# Patient Record
Sex: FEMALE | Race: WHITE | URBAN - METROPOLITAN AREA
[De-identification: names, ages, dates, MRNs, and addresses within clinical notes are randomized per-mention and may not be internally consistent; named-entity substitution may affect disease eponyms.]

---

## 2022-04-20 ENCOUNTER — APPOINTMENT (RX ONLY)
Dept: URBAN - METROPOLITAN AREA CLINIC 162 | Facility: CLINIC | Age: 72
Setting detail: DERMATOLOGY
End: 2022-04-20

## 2022-04-20 DIAGNOSIS — Z71.89 OTHER SPECIFIED COUNSELING: ICD-10-CM

## 2022-04-20 DIAGNOSIS — L85.3 XEROSIS CUTIS: ICD-10-CM

## 2022-04-20 DIAGNOSIS — L82.0 INFLAMED SEBORRHEIC KERATOSIS: ICD-10-CM

## 2022-04-20 DIAGNOSIS — L57.0 ACTINIC KERATOSIS: ICD-10-CM

## 2022-04-20 DIAGNOSIS — L57.8 OTHER SKIN CHANGES DUE TO CHRONIC EXPOSURE TO NONIONIZING RADIATION: ICD-10-CM

## 2022-04-20 PROCEDURE — 17000 DESTRUCT PREMALG LESION: CPT | Mod: 59

## 2022-04-20 PROCEDURE — 17003 DESTRUCT PREMALG LES 2-14: CPT | Mod: 59

## 2022-04-20 PROCEDURE — ? TREATMENT REGIMEN

## 2022-04-20 PROCEDURE — 99203 OFFICE O/P NEW LOW 30 MIN: CPT | Mod: 25

## 2022-04-20 PROCEDURE — ? LIQUID NITROGEN (CM)

## 2022-04-20 PROCEDURE — ? FULL BODY SKIN EXAM

## 2022-04-20 PROCEDURE — 17110 DESTRUCTION B9 LES UP TO 14: CPT

## 2022-04-20 PROCEDURE — ? COUNSELING

## 2022-04-20 ASSESSMENT — LOCATION SIMPLE DESCRIPTION DERM
LOCATION SIMPLE: LEFT LOWER BACK
LOCATION SIMPLE: CHEST
LOCATION SIMPLE: LEFT ANTERIOR NECK
LOCATION SIMPLE: RIGHT CHEEK
LOCATION SIMPLE: RIGHT SHOULDER
LOCATION SIMPLE: LEFT UPPER BACK
LOCATION SIMPLE: RIGHT LOWER BACK
LOCATION SIMPLE: RIGHT UPPER BACK
LOCATION SIMPLE: RIGHT CLAVICULAR SKIN
LOCATION SIMPLE: LEFT TEMPLE

## 2022-04-20 ASSESSMENT — LOCATION DETAILED DESCRIPTION DERM
LOCATION DETAILED: RIGHT LATERAL MANDIBULAR CHEEK
LOCATION DETAILED: LEFT SUPERIOR MEDIAL MIDBACK
LOCATION DETAILED: RIGHT CLAVICULAR SKIN
LOCATION DETAILED: LEFT SUPERIOR UPPER BACK
LOCATION DETAILED: RIGHT INFERIOR MEDIAL MIDBACK
LOCATION DETAILED: RIGHT INFERIOR CENTRAL MALAR CHEEK
LOCATION DETAILED: LEFT CLAVICULAR NECK
LOCATION DETAILED: RIGHT MID-UPPER BACK
LOCATION DETAILED: LEFT INFERIOR TEMPLE
LOCATION DETAILED: LEFT MEDIAL UPPER BACK
LOCATION DETAILED: RIGHT CENTRAL MANDIBULAR CHEEK
LOCATION DETAILED: RIGHT ANTERIOR SHOULDER
LOCATION DETAILED: RIGHT SUPERIOR UPPER BACK
LOCATION DETAILED: LEFT LATERAL SUPERIOR CHEST

## 2022-04-20 ASSESSMENT — LOCATION ZONE DERM
LOCATION ZONE: TRUNK
LOCATION ZONE: FACE
LOCATION ZONE: NECK
LOCATION ZONE: ARM

## 2022-04-20 NOTE — PROCEDURE: TREATMENT REGIMEN
Initiate Treatment: Apply Aquaphor after shower while the skin still wet once daily.
Detail Level: Simple

## 2022-04-20 NOTE — PROCEDURE: LIQUID NITROGEN
Detail Level: Detailed
Consent: The patient's consent was obtained including but not limited to risks of crusting, scabbing, blistering, scarring, darker or lighter pigmentary change, recurrence, incomplete removal and infection.
Render Post-Care Instructions In Note?: no
Post-Care Instructions: I reviewed with the patient in detail post-care instructions. Patient is to wear sunprotection, and avoid picking at any of the treated lesions. Pt may apply Vaseline to crusted or scabbing areas.
Medical Necessity Information: It is in your best interest to select a reason for this procedure from the list below. All of these items fulfill various CMS LCD requirements except the new and changing color options.
Medical Necessity Clause: This procedure was medically necessary because the lesions that were treated were:
Size Of Lesion In Cm (Optional): 0

## 2024-03-25 ENCOUNTER — APPOINTMENT (RX ONLY)
Dept: URBAN - METROPOLITAN AREA CLINIC 162 | Facility: CLINIC | Age: 74
Setting detail: DERMATOLOGY
End: 2024-03-25

## 2024-03-25 DIAGNOSIS — Z12.83 ENCOUNTER FOR SCREENING FOR MALIGNANT NEOPLASM OF SKIN: ICD-10-CM

## 2024-03-25 DIAGNOSIS — L72.0 EPIDERMAL CYST: ICD-10-CM

## 2024-03-25 DIAGNOSIS — L81.4 OTHER MELANIN HYPERPIGMENTATION: ICD-10-CM

## 2024-03-25 DIAGNOSIS — D18.0 HEMANGIOMA: ICD-10-CM

## 2024-03-25 DIAGNOSIS — L71.8 OTHER ROSACEA: ICD-10-CM | Status: INADEQUATELY CONTROLLED

## 2024-03-25 DIAGNOSIS — D22 MELANOCYTIC NEVI: ICD-10-CM

## 2024-03-25 DIAGNOSIS — L57.0 ACTINIC KERATOSIS: ICD-10-CM | Status: WORSENING

## 2024-03-25 DIAGNOSIS — L82.1 OTHER SEBORRHEIC KERATOSIS: ICD-10-CM

## 2024-03-25 PROBLEM — D18.01 HEMANGIOMA OF SKIN AND SUBCUTANEOUS TISSUE: Status: ACTIVE | Noted: 2024-03-25

## 2024-03-25 PROBLEM — D22.62 MELANOCYTIC NEVI OF LEFT UPPER LIMB, INCLUDING SHOULDER: Status: ACTIVE | Noted: 2024-03-25

## 2024-03-25 PROCEDURE — ? PRESCRIPTION MEDICATION MANAGEMENT

## 2024-03-25 PROCEDURE — 17003 DESTRUCT PREMALG LES 2-14: CPT

## 2024-03-25 PROCEDURE — ? COUNSELING

## 2024-03-25 PROCEDURE — ? FULL BODY SKIN EXAM

## 2024-03-25 PROCEDURE — ? COSMETIC QUOTE

## 2024-03-25 PROCEDURE — ? EDUCATIONAL RESOURCES PROVIDED

## 2024-03-25 PROCEDURE — ? SKIN MEDICINALS

## 2024-03-25 PROCEDURE — ? TOPICAL RETINOID COUNSELING

## 2024-03-25 PROCEDURE — ? LIQUID NITROGEN

## 2024-03-25 PROCEDURE — 99214 OFFICE O/P EST MOD 30 MIN: CPT | Mod: 25

## 2024-03-25 PROCEDURE — 17000 DESTRUCT PREMALG LESION: CPT

## 2024-03-25 PROCEDURE — ? TREATMENT REGIMEN

## 2024-03-25 ASSESSMENT — LOCATION DETAILED DESCRIPTION DERM
LOCATION DETAILED: RIGHT CENTRAL TEMPLE
LOCATION DETAILED: LEFT DISTAL DORSAL FOREARM
LOCATION DETAILED: RIGHT MEDIAL FOREHEAD
LOCATION DETAILED: LEFT RADIAL DORSAL HAND
LOCATION DETAILED: LEFT INFERIOR MEDIAL FOREHEAD
LOCATION DETAILED: LEFT DORSAL MIDDLE METACARPOPHALANGEAL JOINT
LOCATION DETAILED: LEFT MEDIAL MALAR CHEEK
LOCATION DETAILED: LEFT INFERIOR TEMPLE
LOCATION DETAILED: RIGHT FOREHEAD
LOCATION DETAILED: RIGHT MID TEMPLE
LOCATION DETAILED: LEFT MEDIAL FOREHEAD

## 2024-03-25 ASSESSMENT — LOCATION SIMPLE DESCRIPTION DERM
LOCATION SIMPLE: LEFT CHEEK
LOCATION SIMPLE: LEFT TEMPLE
LOCATION SIMPLE: RIGHT TEMPLE
LOCATION SIMPLE: LEFT HAND
LOCATION SIMPLE: LEFT FOREARM
LOCATION SIMPLE: LEFT FOREHEAD
LOCATION SIMPLE: RIGHT FOREHEAD

## 2024-03-25 ASSESSMENT — LOCATION ZONE DERM
LOCATION ZONE: HAND
LOCATION ZONE: ARM
LOCATION ZONE: FACE

## 2024-03-25 NOTE — PROCEDURE: SKIN MEDICINALS
Sig: Take one pill daily
Sig: Apply pea sized amount per area at night
Sig: Apply to affected areas twice daily
Sig: Apply nightly to warts nightly under occlusion
Sig: Apply a thin layer to the affected areas twice daily
Sig: Take one twice daily
Sig: Apply a thin layer to the affected nails daily
Sig: Apply a thin layer to the affected skin twice daily
Sig: Take one pill twice daily
Sig: Apply to the affected skin twice daily
Sig: Apply a thin layer to the affected areas daily
Sig: Apply twice daily for 5 days
Sig: Apply a thin layer to the areas with decreased hair density 1-2 times daily
Sig: Apply a thin layer to the itching areas twice daily as needed
Sig: Apply a thin layer to the scar daily
Sig: Apply to affected areas on face twice daily
Sig: Use as directed when washing hair
Sig: Wash affected areas daily.
Detail Level: Detailed
Intro Statement: I recommended the following products:
Product Type (1): Acne
Acne Medicines Prescription 1: Tretinoin 0.025%, Niacinamide 2%, Azelaic 8%, Hyaluronic Acid 0.25% Cream

## 2024-03-25 NOTE — PROCEDURE: PRESCRIPTION MEDICATION MANAGEMENT
Render In Strict Bullet Format?: No
Detail Level: Zone
Initiate Treatment: Tretinoin with finacea.25%qhs in the face

## 2024-03-25 NOTE — PROCEDURE: COSMETIC QUOTE
Voluma Price Per Syringe: 500
Botox Price Per Unit: 15
Detail Level: Simple
Misc Procedure Description: DPN destruction
Discount Percentage: 0
Notice: We have created a more complete Cosmetic Quote plan.  The procedure name is also Cosmetic Quote.  Please review the new plan and hide the Cosmetic Quote plan you do not want to use.

## 2024-03-25 NOTE — PROCEDURE: LIQUID NITROGEN
Duration Of Freeze Thaw-Cycle (Seconds): 10
Post-Care Instructions: I reviewed with the patient in detail post-care instructions. Patient is to wear sunprotection, and avoid picking at any of the treated lesions. Pt may apply Vaseline to crusted or scabbing areas.
Number Of Freeze-Thaw Cycles: 3 freeze-thaw cycles
Render Note In Bullet Format When Appropriate: No
Show Aperture Variable?: Yes
Detail Level: Detailed
Consent: The patient's consent was obtained including but not limited to risks of crusting, scabbing, blistering, scarring, darker or lighter pigmentary change, recurrence, incomplete removal and infection.

## 2024-07-23 ENCOUNTER — APPOINTMENT (RX ONLY)
Dept: URBAN - METROPOLITAN AREA CLINIC 162 | Facility: CLINIC | Age: 74
Setting detail: DERMATOLOGY
End: 2024-07-23

## 2024-07-23 DIAGNOSIS — L91.8 OTHER HYPERTROPHIC DISORDERS OF THE SKIN: ICD-10-CM | Status: STABLE

## 2024-07-23 PROCEDURE — ? BENIGN DESTRUCTION COSMETIC

## 2024-07-23 PROCEDURE — ? COUNSELING

## 2024-07-23 ASSESSMENT — LOCATION DETAILED DESCRIPTION DERM
LOCATION DETAILED: RIGHT PROXIMAL POSTERIOR UPPER ARM
LOCATION DETAILED: LEFT ANTERIOR PROXIMAL UPPER ARM
LOCATION DETAILED: RIGHT MEDIAL BREAST 2-3:00 REGION
LOCATION DETAILED: RIGHT MEDIAL SUPERIOR CHEST
LOCATION DETAILED: LEFT PROXIMAL POSTERIOR UPPER ARM
LOCATION DETAILED: RIGHT ANTERIOR SHOULDER
LOCATION DETAILED: RIGHT CLAVICULAR NECK
LOCATION DETAILED: RIGHT LATERAL BUCCAL CHEEK
LOCATION DETAILED: LEFT CENTRAL TEMPLE
LOCATION DETAILED: LEFT CLAVICULAR NECK
LOCATION DETAILED: RIGHT INFERIOR LATERAL NECK
LOCATION DETAILED: LEFT INFERIOR LATERAL NECK
LOCATION DETAILED: LEFT POSTERIOR SHOULDER
LOCATION DETAILED: LEFT MEDIAL SUPERIOR CHEST
LOCATION DETAILED: LEFT SUPERIOR UPPER BACK

## 2024-07-23 ASSESSMENT — LOCATION ZONE DERM
LOCATION ZONE: NECK
LOCATION ZONE: TRUNK
LOCATION ZONE: ARM
LOCATION ZONE: FACE

## 2024-07-23 ASSESSMENT — LOCATION SIMPLE DESCRIPTION DERM
LOCATION SIMPLE: RIGHT ANTERIOR NECK
LOCATION SIMPLE: LEFT ANTERIOR NECK
LOCATION SIMPLE: LEFT UPPER ARM
LOCATION SIMPLE: LEFT SHOULDER
LOCATION SIMPLE: LEFT UPPER BACK
LOCATION SIMPLE: LEFT POSTERIOR UPPER ARM
LOCATION SIMPLE: RIGHT SHOULDER
LOCATION SIMPLE: RIGHT POSTERIOR UPPER ARM
LOCATION SIMPLE: RIGHT BREAST
LOCATION SIMPLE: RIGHT CHEEK
LOCATION SIMPLE: CHEST
LOCATION SIMPLE: LEFT TEMPLE

## 2024-07-23 NOTE — PROCEDURE: BENIGN DESTRUCTION COSMETIC
Price (Use Numbers Only, No Special Characters Or $): 150
Consent: The patient's consent was obtained including but not limited to risks of crusting, scabbing, blistering, scarring, darker or lighter pigmentary change, recurrence, incomplete removal and infection.
Post-Care Instructions: I reviewed with the patient in detail post-care instructions. Patient is to wear sunprotection, and avoid picking at any of the treated lesions. Pt may apply Vaseline to crusted or scabbing areas.
Detail Level: Detailed

## 2025-06-09 ENCOUNTER — APPOINTMENT (OUTPATIENT)
Dept: URBAN - METROPOLITAN AREA CLINIC 141 | Facility: CLINIC | Age: 75
Setting detail: DERMATOLOGY
End: 2025-06-09

## 2025-06-09 DIAGNOSIS — L82.1 OTHER SEBORRHEIC KERATOSIS: ICD-10-CM

## 2025-06-09 DIAGNOSIS — L91.8 OTHER HYPERTROPHIC DISORDERS OF THE SKIN: ICD-10-CM

## 2025-06-09 DIAGNOSIS — L30.0 NUMMULAR DERMATITIS: ICD-10-CM

## 2025-06-09 DIAGNOSIS — L72.0 EPIDERMAL CYST: ICD-10-CM

## 2025-06-09 PROCEDURE — ? ADDITIONAL NOTES

## 2025-06-09 PROCEDURE — ? COUNSELING: TOPICAL STEROIDS

## 2025-06-09 PROCEDURE — ? PRESCRIPTION

## 2025-06-09 PROCEDURE — ? DEFER

## 2025-06-09 PROCEDURE — ? COUNSELING

## 2025-06-09 RX ORDER — TRIAMCINOLONE ACETONIDE 1 MG/G
CREAM TOPICAL BID
Qty: 80 | Refills: 2 | Status: ERX | COMMUNITY
Start: 2025-06-09

## 2025-06-09 RX ADMIN — TRIAMCINOLONE ACETONIDE: 1 CREAM TOPICAL at 00:00

## 2025-06-09 ASSESSMENT — LOCATION DETAILED DESCRIPTION DERM
LOCATION DETAILED: RIGHT LATERAL EYEBROW
LOCATION DETAILED: LEFT MEDIAL MALAR CHEEK
LOCATION DETAILED: LEFT PROXIMAL PRETIBIAL REGION
LOCATION DETAILED: LEFT INFERIOR MEDIAL MALAR CHEEK
LOCATION DETAILED: RIGHT PROXIMAL PRETIBIAL REGION
LOCATION DETAILED: RIGHT INFERIOR CENTRAL MALAR CHEEK
LOCATION DETAILED: RIGHT MEDIAL MALAR CHEEK
LOCATION DETAILED: LEFT INFERIOR ANTERIOR NECK

## 2025-06-09 ASSESSMENT — LOCATION SIMPLE DESCRIPTION DERM
LOCATION SIMPLE: RIGHT EYEBROW
LOCATION SIMPLE: RIGHT PRETIBIAL REGION
LOCATION SIMPLE: LEFT CHEEK
LOCATION SIMPLE: RIGHT CHEEK
LOCATION SIMPLE: LEFT PRETIBIAL REGION
LOCATION SIMPLE: LEFT ANTERIOR NECK

## 2025-06-09 ASSESSMENT — LOCATION ZONE DERM
LOCATION ZONE: FACE
LOCATION ZONE: LEG
LOCATION ZONE: NECK

## 2025-06-09 NOTE — PROCEDURE: ADDITIONAL NOTES
Additional Notes: Referred to Kassandra Chen for facial to help treat milia.
Detail Level: Zone
Render Risk Assessment In Note?: no
Additional Notes: Pt quoted: \\n1-3: $45\\n4-10: $110\\n11-19: $175\\n20+: $200

## 2025-06-17 ENCOUNTER — APPOINTMENT (OUTPATIENT)
Dept: URBAN - METROPOLITAN AREA CLINIC 141 | Facility: CLINIC | Age: 75
Setting detail: DERMATOLOGY
End: 2025-06-17

## 2025-06-17 DIAGNOSIS — L82.1 OTHER SEBORRHEIC KERATOSIS: ICD-10-CM

## 2025-06-17 DIAGNOSIS — L91.8 OTHER HYPERTROPHIC DISORDERS OF THE SKIN: ICD-10-CM

## 2025-06-17 DIAGNOSIS — Z41.9 ENCOUNTER FOR PROCEDURE FOR PURPOSES OTHER THAN REMEDYING HEALTH STATE, UNSPECIFIED: ICD-10-CM

## 2025-06-17 PROCEDURE — ? COSMETIC EXTRACTIONS

## 2025-06-17 PROCEDURE — ? VENUS VERSA IPL

## 2025-06-17 PROCEDURE — ? BENIGN DESTRUCTION COSMETIC

## 2025-06-17 PROCEDURE — ? COSMETIC CONSULTATION: CHEMICAL PEELS

## 2025-06-17 PROCEDURE — ? COSMETIC CONSULTATION: PRODUCTS

## 2025-06-17 PROCEDURE — ? COSMETIC CONSULTATION: IPL

## 2025-06-17 PROCEDURE — ? COUNSELING

## 2025-06-17 ASSESSMENT — LOCATION DETAILED DESCRIPTION DERM
LOCATION DETAILED: RIGHT INFERIOR CENTRAL MALAR CHEEK
LOCATION DETAILED: RIGHT CLAVICULAR NECK
LOCATION DETAILED: RIGHT LATERAL MALAR CHEEK
LOCATION DETAILED: LEFT CLAVICULAR SKIN
LOCATION DETAILED: STERNAL NOTCH
LOCATION DETAILED: RIGHT MEDIAL TRAPEZIAL NECK
LOCATION DETAILED: LEFT CLAVICULAR NECK
LOCATION DETAILED: LEFT INFERIOR ANTERIOR NECK
LOCATION DETAILED: RIGHT INFERIOR LATERAL NECK
LOCATION DETAILED: RIGHT SUPERIOR ANTERIOR NECK
LOCATION DETAILED: RIGHT CHIN
LOCATION DETAILED: LEFT ANTERIOR SHOULDER
LOCATION DETAILED: LEFT SUPERIOR LATERAL MALAR CHEEK
LOCATION DETAILED: RIGHT CENTRAL MALAR CHEEK
LOCATION DETAILED: RIGHT ANTERIOR SHOULDER
LOCATION DETAILED: LEFT CENTRAL MALAR CHEEK
LOCATION DETAILED: RIGHT LATERAL SUPERIOR CHEST
LOCATION DETAILED: RIGHT CLAVICULAR SKIN

## 2025-06-17 ASSESSMENT — LOCATION SIMPLE DESCRIPTION DERM
LOCATION SIMPLE: CHEST
LOCATION SIMPLE: LEFT CLAVICULAR SKIN
LOCATION SIMPLE: RIGHT SHOULDER
LOCATION SIMPLE: RIGHT ANTERIOR NECK
LOCATION SIMPLE: RIGHT CHEEK
LOCATION SIMPLE: POSTERIOR NECK
LOCATION SIMPLE: LEFT SHOULDER
LOCATION SIMPLE: LEFT CHEEK
LOCATION SIMPLE: CHIN
LOCATION SIMPLE: LEFT ANTERIOR NECK
LOCATION SIMPLE: RIGHT CHEEK
LOCATION SIMPLE: RIGHT CLAVICULAR SKIN

## 2025-06-17 ASSESSMENT — LOCATION ZONE DERM
LOCATION ZONE: FACE
LOCATION ZONE: ARM
LOCATION ZONE: TRUNK
LOCATION ZONE: NECK
LOCATION ZONE: FACE

## 2025-06-17 NOTE — PROCEDURE: COSMETIC EXTRACTIONS
Consent: The patient's consent was obtained including but not limited to risks of crusting, scabbing, blistering, scarring, darker or lighter pigmentary change, recurrence, incomplete removal and infection.
Anesthesia Volume In Cc: 0
Post-Care Instructions: Patient concerned with bumps on skin.  Removed few milia successfully.  No sensitivity to protocol.
Render The Number Of Extractions: No
Detail Level: Detailed

## 2025-06-17 NOTE — PROCEDURE: BENIGN DESTRUCTION COSMETIC
Price (Use Numbers Only, No Special Characters Or $): 100
Post-Care Instructions: I reviewed with the patient in detail post-care instructions. Patient is to wear sunprotection, and avoid picking at any of the treated lesions. Pt may apply Vaseline to crusted or scabbing areas.
Anesthesia Type: 0.5% lidocaine with 1:200,000 epinephrine
Anesthesia Volume In Cc: 0.5
Consent: The patient's consent was obtained including but not limited to risks of crusting, scabbing, blistering, scarring, darker or lighter pigmentary change, recurrence, incomplete removal and infection.
Detail Level: Detailed

## 2025-06-17 NOTE — PROCEDURE: VENUS VERSA IPL
Consent: Pretreatment photos taken.  Written consent obtained, risks reviewed including but not limited to crusting, scabbing, blistering, scarring, darker or lighter pigmentary change, bruising, and/or incomplete response.\\n\\nIPL to cheeks.\\n\\n515 handpiece.  No adverse response to test pulse.\\n\\n16j/15ms 80% cooling, one pulse to face. Observed mild erythema, no edema.  Observed darkening of brown spots.  SPF was applied.\\n\\nAdvised patient to avoid sun exposure.  Discussed importance of daily spf application.  Advised not to scratch or scrub brown spots when they begin to lift and slough to the surface.  Patient understands multiple tx may be necessary and maintenance for desired outcome.\\n\\nPatient will return 1mo for follow up.
Number Of Passes: 1
Frequency: 1 Hz
Cooling: 80
Fluence Units: J/cm2
Anesthesia Volume In Cc: 0
Skin Type (Optional): III
Pulse Duration (In Milliseconds): 15
Applicator: [facility]
Fluence: 16
Treated Area: small area
Pulse Mode: single
Detail Level: Zone